# Patient Record
Sex: MALE | Race: ASIAN | Employment: FULL TIME | URBAN - METROPOLITAN AREA
[De-identification: names, ages, dates, MRNs, and addresses within clinical notes are randomized per-mention and may not be internally consistent; named-entity substitution may affect disease eponyms.]

---

## 2020-02-21 ENCOUNTER — HOSPITAL ENCOUNTER (EMERGENCY)
Age: 50
Discharge: HOME | End: 2020-02-22
Attending: EMERGENCY MEDICINE
Payer: SELF-PAY

## 2020-02-21 ENCOUNTER — APPOINTMENT (OUTPATIENT)
Dept: GENERAL RADIOLOGY | Age: 50
End: 2020-02-21
Attending: EMERGENCY MEDICINE
Payer: SELF-PAY

## 2020-02-21 DIAGNOSIS — R07.9 CHEST PAIN, UNSPECIFIED TYPE: Primary | ICD-10-CM

## 2020-02-21 LAB
BASOPHILS # BLD: 0 K/UL (ref 0–0.1)
BASOPHILS NFR BLD: 0 % (ref 0–2)
DIFFERENTIAL METHOD BLD: ABNORMAL
EOSINOPHIL # BLD: 0.1 K/UL (ref 0–0.4)
EOSINOPHIL NFR BLD: 1 % (ref 0–5)
ERYTHROCYTE [DISTWIDTH] IN BLOOD BY AUTOMATED COUNT: 12.5 % (ref 11.6–14.5)
HCT VFR BLD AUTO: 47.3 % (ref 36–48)
HGB BLD-MCNC: 16.6 G/DL (ref 13–16)
LYMPHOCYTES # BLD: 2 K/UL (ref 0.9–3.6)
LYMPHOCYTES NFR BLD: 19 % (ref 21–52)
MCH RBC QN AUTO: 30.2 PG (ref 24–34)
MCHC RBC AUTO-ENTMCNC: 35.1 G/DL (ref 31–37)
MCV RBC AUTO: 86.2 FL (ref 74–97)
MONOCYTES # BLD: 0.5 K/UL (ref 0.05–1.2)
MONOCYTES NFR BLD: 5 % (ref 3–10)
NEUTS SEG # BLD: 7.7 K/UL (ref 1.8–8)
NEUTS SEG NFR BLD: 75 % (ref 40–73)
PLATELET # BLD AUTO: 237 K/UL (ref 135–420)
PMV BLD AUTO: 10.4 FL (ref 9.2–11.8)
RBC # BLD AUTO: 5.49 M/UL (ref 4.7–5.5)
WBC # BLD AUTO: 10.3 K/UL (ref 4.6–13.2)

## 2020-02-21 PROCEDURE — 80053 COMPREHEN METABOLIC PANEL: CPT

## 2020-02-21 PROCEDURE — 82550 ASSAY OF CK (CPK): CPT

## 2020-02-21 PROCEDURE — 99285 EMERGENCY DEPT VISIT HI MDM: CPT

## 2020-02-21 PROCEDURE — 94762 N-INVAS EAR/PLS OXIMTRY CONT: CPT

## 2020-02-21 PROCEDURE — 93005 ELECTROCARDIOGRAM TRACING: CPT

## 2020-02-21 PROCEDURE — 71045 X-RAY EXAM CHEST 1 VIEW: CPT

## 2020-02-21 PROCEDURE — 85025 COMPLETE CBC W/AUTO DIFF WBC: CPT

## 2020-02-21 RX ORDER — FAMOTIDINE 20 MG/1
20 TABLET, FILM COATED ORAL
Status: COMPLETED | OUTPATIENT
Start: 2020-02-21 | End: 2020-02-22

## 2020-02-21 RX ORDER — LIDOCAINE HYDROCHLORIDE 20 MG/ML
15 SOLUTION OROPHARYNGEAL
Status: COMPLETED | OUTPATIENT
Start: 2020-02-21 | End: 2020-02-22

## 2020-02-21 RX ORDER — GUAIFENESIN 100 MG/5ML
324 LIQUID (ML) ORAL
Status: COMPLETED | OUTPATIENT
Start: 2020-02-21 | End: 2020-02-22

## 2020-02-22 VITALS
TEMPERATURE: 97.6 F | SYSTOLIC BLOOD PRESSURE: 149 MMHG | HEART RATE: 73 BPM | OXYGEN SATURATION: 99 % | BODY MASS INDEX: 24.39 KG/M2 | WEIGHT: 155.42 LBS | HEIGHT: 67 IN | DIASTOLIC BLOOD PRESSURE: 86 MMHG | RESPIRATION RATE: 11 BRPM

## 2020-02-22 LAB
ALBUMIN SERPL-MCNC: 4.1 G/DL (ref 3.4–5)
ALBUMIN/GLOB SERPL: 1.1 {RATIO} (ref 0.8–1.7)
ALP SERPL-CCNC: 83 U/L (ref 45–117)
ALT SERPL-CCNC: 22 U/L (ref 16–61)
ANION GAP SERPL CALC-SCNC: 4 MMOL/L (ref 3–18)
AST SERPL-CCNC: 11 U/L (ref 10–38)
ATRIAL RATE: 79 BPM
BILIRUB SERPL-MCNC: 0.8 MG/DL (ref 0.2–1)
BUN SERPL-MCNC: 14 MG/DL (ref 7–18)
BUN/CREAT SERPL: 17 (ref 12–20)
CALCIUM SERPL-MCNC: 8.7 MG/DL (ref 8.5–10.1)
CALCULATED P AXIS, ECG09: 2 DEGREES
CALCULATED R AXIS, ECG10: 9 DEGREES
CALCULATED T AXIS, ECG11: 17 DEGREES
CHLORIDE SERPL-SCNC: 102 MMOL/L (ref 100–111)
CK MB CFR SERPL CALC: NORMAL % (ref 0–4)
CK MB SERPL-MCNC: <1 NG/ML (ref 5–25)
CK SERPL-CCNC: 70 U/L (ref 39–308)
CO2 SERPL-SCNC: 31 MMOL/L (ref 21–32)
CREAT SERPL-MCNC: 0.84 MG/DL (ref 0.6–1.3)
DIAGNOSIS, 93000: NORMAL
GLOBULIN SER CALC-MCNC: 3.7 G/DL (ref 2–4)
GLUCOSE SERPL-MCNC: 102 MG/DL (ref 74–99)
P-R INTERVAL, ECG05: 142 MS
POTASSIUM SERPL-SCNC: 3.1 MMOL/L (ref 3.5–5.5)
PROT SERPL-MCNC: 7.8 G/DL (ref 6.4–8.2)
Q-T INTERVAL, ECG07: 374 MS
QRS DURATION, ECG06: 86 MS
QTC CALCULATION (BEZET), ECG08: 428 MS
SODIUM SERPL-SCNC: 137 MMOL/L (ref 136–145)
TROPONIN I SERPL-MCNC: <0.02 NG/ML (ref 0–0.04)
VENTRICULAR RATE, ECG03: 79 BPM

## 2020-02-22 PROCEDURE — 74011250637 HC RX REV CODE- 250/637: Performed by: STUDENT IN AN ORGANIZED HEALTH CARE EDUCATION/TRAINING PROGRAM

## 2020-02-22 PROCEDURE — 74011000250 HC RX REV CODE- 250: Performed by: STUDENT IN AN ORGANIZED HEALTH CARE EDUCATION/TRAINING PROGRAM

## 2020-02-22 RX ORDER — OMEPRAZOLE 20 MG/1
20 CAPSULE, DELAYED RELEASE ORAL DAILY
Qty: 45 CAP | Refills: 0 | Status: SHIPPED | OUTPATIENT
Start: 2020-02-22 | End: 2020-02-22 | Stop reason: ALTCHOICE

## 2020-02-22 RX ORDER — HYDROXYZINE 50 MG/1
50 TABLET, FILM COATED ORAL
Qty: 20 TAB | Refills: 0 | Status: SHIPPED | OUTPATIENT
Start: 2020-02-22 | End: 2020-03-03

## 2020-02-22 RX ORDER — PANTOPRAZOLE SODIUM 40 MG/1
40 TABLET, DELAYED RELEASE ORAL DAILY
Qty: 20 TAB | Refills: 0 | Status: SHIPPED | OUTPATIENT
Start: 2020-02-22 | End: 2020-03-13

## 2020-02-22 RX ADMIN — FAMOTIDINE 20 MG: 20 TABLET, FILM COATED ORAL at 00:04

## 2020-02-22 RX ADMIN — ALUMINUM HYDROXIDE AND MAGNESIUM HYDROXIDE 15 ML: 200; 200 SUSPENSION ORAL at 00:04

## 2020-02-22 RX ADMIN — LIDOCAINE HYDROCHLORIDE 15 ML: 20 SOLUTION ORAL; TOPICAL at 00:04

## 2020-02-22 RX ADMIN — ASPIRIN 81 MG 324 MG: 81 TABLET ORAL at 00:03

## 2020-02-22 NOTE — ED PROVIDER NOTES
EMERGENCY DEPARTMENT HISTORY AND PHYSICAL EXAM    12:23 AM      Date: 2/21/2020  Patient Name: Remigio Banegas    History of Presenting Illness     Chief Complaint   Patient presents with    Chest Pain         History Provided By: Patient via   Location/Duration/Severity/Modifying factors   Interview completed with translation service. Patient is a 79-year-old male with history of hypertension presenting with 3 days of central chest pain described as \"anxiety. \" He describes it severity as 2 out of 10. States it has occurred once before approximately 1 year ago but went away on its own. He endorses chronic nausea and vague abdominal pain, that is unchanged in recent weeks. Denies vomiting, change in bowel movements, diaphoresis, shortness of breath. States he takes [facility] medications for his hypertension and nausea, cannot relate names. He notes stressful trigger includes working many hours including several overnights this past week. PCP: None        Past History     Past Medical History:  History reviewed. No pertinent past medical history. Past Surgical History:  History reviewed. No pertinent surgical history. Family History:  History reviewed. No pertinent family history. Social History:  Social History     Tobacco Use    Smoking status: Unknown If Ever Smoked   Substance Use Topics    Alcohol use: Not Currently    Drug use: Not on file       Allergies:  No Known Allergies      Review of Systems       Review of Systems   Constitutional: Negative for chills, diaphoresis and fever. HENT: Negative for congestion and sore throat. Respiratory: Negative for cough and shortness of breath. Cardiovascular: Positive for chest pain. Negative for leg swelling. Gastrointestinal: Positive for abdominal pain and nausea. Negative for blood in stool, constipation, diarrhea and vomiting. All other systems reviewed and are negative.         Physical Exam     Visit Vitals  /86 Pulse 73   Temp 97.6 °F (36.4 °C)   Resp 11   Ht 5' 6.93\" (1.7 m)   Wt 70.5 kg (155 lb 6.8 oz)   SpO2 99%   BMI 24.39 kg/m²         Physical Exam  Vitals signs and nursing note reviewed. Constitutional:       General: He is not in acute distress. Appearance: He is well-developed and normal weight. HENT:      Head: Normocephalic and atraumatic. Eyes:      Extraocular Movements: Extraocular movements intact. Pupils: Pupils are equal, round, and reactive to light. Neck:      Musculoskeletal: Normal range of motion and neck supple. Vascular: No JVD. Cardiovascular:      Rate and Rhythm: Normal rate and regular rhythm. No extrasystoles are present. Pulses:           Radial pulses are 2+ on the right side and 2+ on the left side. Heart sounds: Heart sounds not distant. No murmur. No friction rub. No S3 or S4 sounds. Pulmonary:      Effort: Pulmonary effort is normal. No tachypnea or respiratory distress. Breath sounds: Normal breath sounds. No decreased breath sounds, wheezing or rhonchi. Chest:      Chest wall: No tenderness. Abdominal:      General: Bowel sounds are normal.      Palpations: Abdomen is soft. Tenderness: There is no abdominal tenderness. Musculoskeletal:      Right lower leg: No edema. Left lower leg: No edema. Lymphadenopathy:      Cervical: No cervical adenopathy. Skin:     General: Skin is warm and dry. Capillary Refill: Capillary refill takes less than 2 seconds. Neurological:      Mental Status: He is alert.            Diagnostic Study Results     Labs -  Recent Results (from the past 12 hour(s))   EKG, 12 LEAD, INITIAL    Collection Time: 02/21/20 11:18 PM   Result Value Ref Range    Ventricular Rate 79 BPM    Atrial Rate 79 BPM    P-R Interval 142 ms    QRS Duration 86 ms    Q-T Interval 374 ms    QTC Calculation (Bezet) 428 ms    Calculated P Axis 2 degrees    Calculated R Axis 9 degrees    Calculated T Axis 17 degrees Diagnosis       Normal sinus rhythm  Normal ECG  No previous ECGs available     CBC WITH AUTOMATED DIFF    Collection Time: 02/21/20 11:30 PM   Result Value Ref Range    WBC 10.3 4.6 - 13.2 K/uL    RBC 5.49 4.70 - 5.50 M/uL    HGB 16.6 (H) 13.0 - 16.0 g/dL    HCT 47.3 36.0 - 48.0 %    MCV 86.2 74.0 - 97.0 FL    MCH 30.2 24.0 - 34.0 PG    MCHC 35.1 31.0 - 37.0 g/dL    RDW 12.5 11.6 - 14.5 %    PLATELET 168 163 - 321 K/uL    MPV 10.4 9.2 - 11.8 FL    NEUTROPHILS 75 (H) 40 - 73 %    LYMPHOCYTES 19 (L) 21 - 52 %    MONOCYTES 5 3 - 10 %    EOSINOPHILS 1 0 - 5 %    BASOPHILS 0 0 - 2 %    ABS. NEUTROPHILS 7.7 1.8 - 8.0 K/UL    ABS. LYMPHOCYTES 2.0 0.9 - 3.6 K/UL    ABS. MONOCYTES 0.5 0.05 - 1.2 K/UL    ABS. EOSINOPHILS 0.1 0.0 - 0.4 K/UL    ABS. BASOPHILS 0.0 0.0 - 0.1 K/UL    DF AUTOMATED     CARDIAC PANEL,(CK, CKMB & TROPONIN)    Collection Time: 02/21/20 11:30 PM   Result Value Ref Range    CK 70 39 - 308 U/L    CK - MB <1.0 <3.6 ng/ml    CK-MB Index  0.0 - 4.0 %     CALCULATION NOT PERFORMED WHEN RESULT IS BELOW LINEAR LIMIT    Troponin-I, QT <0.02 0.0 - 7.037 NG/ML   METABOLIC PANEL, COMPREHENSIVE    Collection Time: 02/21/20 11:30 PM   Result Value Ref Range    Sodium 137 136 - 145 mmol/L    Potassium 3.1 (L) 3.5 - 5.5 mmol/L    Chloride 102 100 - 111 mmol/L    CO2 31 21 - 32 mmol/L    Anion gap 4 3.0 - 18 mmol/L    Glucose 102 (H) 74 - 99 mg/dL    BUN 14 7.0 - 18 MG/DL    Creatinine 0.84 0.6 - 1.3 MG/DL    BUN/Creatinine ratio 17 12 - 20      GFR est AA >60 >60 ml/min/1.73m2    GFR est non-AA >60 >60 ml/min/1.73m2    Calcium 8.7 8.5 - 10.1 MG/DL    Bilirubin, total 0.8 0.2 - 1.0 MG/DL    ALT (SGPT) 22 16 - 61 U/L    AST (SGOT) 11 10 - 38 U/L    Alk.  phosphatase 83 45 - 117 U/L    Protein, total 7.8 6.4 - 8.2 g/dL    Albumin 4.1 3.4 - 5.0 g/dL    Globulin 3.7 2.0 - 4.0 g/dL    A-G Ratio 1.1 0.8 - 1.7         Radiologic Studies -   XR CHEST PORT    (Results Pending)         Medical Decision Making   I am the first provider for this patient. I reviewed the vital signs, available nursing notes, past medical history, past surgical history, family history and social history. Vital Signs-Reviewed the patient's vital signs. EKG: as read by me, NSR without ischemic changes. Records Reviewed: Nursing Notes (Time of Review: 12:23 AM)    ED Course: Progress Notes, Reevaluation, and Consults:         Provider Notes (Medical Decision Making):   MDM  Number of Diagnoses or Management Options  Chest pain, unspecified type:   Diagnosis management comments: 2010: Patient seen and examined, in no acute distress vital signs stable. EKG shows normal sinus rhythm without ischemic changes. History and exam consistent with anxiety versus GI disease. Differential includes ACS or musculoskeletal pain. Will check CBC CMP cardiac enzymes, EKG, chest x-ray, and provide 324 mg aspirin and a GI cocktail. EKG NSR without ischemic changes. 1235: Labs unremarkable. Chest x-ray as read by me no acute cardiopulmonary process. 0110: Spoke with patient regarding lab results via translation service. Advised that pain is likely combination of anxiety and gastritis versus ulcer. Will discharge home with prescription for Protonix for dyspepsia and hydroxyzine as needed for anxiety. 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 Difficulty told him to ask about work-up for chronic nausea and abdominal discomfort, likely H. pylori infection. Diagnosis     Clinical Impression:   1.  Chest pain, unspecified type        Disposition: discharged    Follow-up Information     Follow up With Specialties Details Why Contact Info    Primary Care Physician   In 1 month New medication, Needs workup for H. Pylori            Discharge Medication List as of 2/22/2020  1:54 AM      START taking these medications    Details pantoprazole (PROTONIX) 40 mg tablet Take 1 Tab by mouth daily for 20 days. Indications: indigestion, Print, Disp-20 Tab, R-0      hydroxyzine HCL (ATARAX) 50 mg tablet Take 1 Tab by mouth every six (6) hours as needed for Itching for up to 10 days. Indications: anxious, Print, Disp-20 Tab, R-0           Disclaimer: Sections of this note are dictated using utilizing voice recognition software. Minor typographical errors may be present. If questions arise, please do not hesitate to contact me or call our department.

## 2020-02-22 NOTE — DISCHARGE INSTRUCTIONS
Patient Education   Patient Education        ??? ???? - [ Chest Pain: Care Instructions ]  ????  ??????????? ????????????????? ??????????????????? ??????????? 8 ? 12 ??????? ??????????????????????  ??????????????????????? ??????????????????????????? ???????????????????????  ????????????? 5 ??????????????????????????? 911 ??????????   ????????????????? ?????????????????????????????????????????? ???????????????????  ?????????  · ???????????  · ?????????? ?????????????????????????  · ???????????????  ????????  ?????????? 911?  · ?????????  · ???????  · ??????????? ?????????  ? ??????????????????  ? ???  ? ???  ? ??????  ? ?????????????????????????????????????????  ? ????????  ? ?????????  ??  911 ?????????????? 1 ?????????? 2 ? 4 ?????????? ?????? ?????????  ?????????????????????????  · ?????  · ?????  · ??????????????  · ?????????  · ?????????????  ??????????  ?? [email or web address]  ? ? [email or web address] ?????????????????? \"??? ???? - [ Chest Pain: Care Instructions ]? \"  ??????: [id number]?  ????: 12.2  © [id number] [employer]. ??????????????? ????????? ??????????????????????????????? [employer] ????????????????????            ??? ???? - [ Gastritis: Care Instructions ]  ??????    ??????????? ????????????? ?????????? ???????????????????? ??????????????? ???????????? ????????????????  ?????????????? ??????????? ???????????????  ?????????????????? ?????????????????????????????????????? ??????????????????????????  ?????????  · ???????????????????? ?????????????? ?????????????  · ???????? ???????????????????????? ??????????????????  · ????????????????????????????  · ??????????????????????? (Pepcid AC)????? (Prilosec)? ???? (Tagamet HB)????? 75 (Zantac 75)???????????  · ?????????????????????????????? ?????????????????? ?????????????????????????????????????  · ??????  · ???????????????????????? ????????  ???????????  ???????????????? ???  911? ????????????????  · ???????????????  · ???????????????????  ??????????????????? ??????????  · ????????????? 8 ??????????  · ???????  ??????????????????????????????  · ?????????  ??????????  ?? [email or web address]  ? ? [email or web address] ?????????????????? \"??? ???? - [ Gastritis: Care Instructions ]? \"  ??????: 2018?11?7?  ????: 12.2  © [id number] [employer]. ??????????????? ????????? ???????????????????????????????  [employer] ????????????????????